# Patient Record
(demographics unavailable — no encounter records)

---

## 2024-10-10 NOTE — HISTORY OF PRESENT ILLNESS
[de-identified] : 10/10/2024: Pt here today for follow-up right knee. Pain and symptoms are about the same. Since last visit pt has had not had relief from CSI at last visit. Today c/o persistent pain in right knee, had good success w/ gel inj series and would like to consider doing another round next month. Last gel, Visco-3 #3, 5/1/2024. Ambulates with cane  9/19/24: He returns for follow up, flare up pain, weakness, instability episodes. He did not find much benefit with PT.  He has been trying to strengthen and do HEP.  He has difficulty with stairs.  He followed with Dr. Kim.  He had second opinion, informed he needs TKA. He is not a position to be OOW to have surgery.  4/18/24: Patient here for follow up of right knee and Visco-3 inj #1  3/21/24: Patient here for follow up of right knee. States pain has not improved. Inquiring about HA injections  2/22/24: The patient is a 59 year old R hand dominant male who presents today complaining of R knee pain.  Date of Injury/Onset: ~01/30/24 Pain At Rest: 3/10  Pain With Activity: 9/10  Mechanism of injury: N/A This not a Work Related Injury being treated under Worker's Compensation. This not an athletic injury occurring associated with an interscholastic or organized sports team. Quality of symptoms: sharp, buckling Improves with: NSAIDs, rest  Worse with: weightbearing, stairs, bending Prior treatment: [facility] care Prior Imaging: x-ray/MRI ([facility]) Reports Available For Review Today: MRI Out of work/sport:  School/Sport/Position/Occupation:   Additional Information: Previous csi (w/ Fortunato) with 12cc aspiration which provided temporary relief

## 2024-10-10 NOTE — DISCUSSION/SUMMARY
[de-identified] : Assessment:   59 year male with history, physical exam and imaging consistent with: RIGHT KNEE ARTHRITIS, RIGHT KNEE MMT  ---------------------------    Plan: - Discussed he ultimately needs TKA.  Increased flare-ups knee pain and mechanical symptoms with any increase in activity  - Submit for Visco 3  - he is aware he cannot have surgery for 3 months post injection  - fu to start Visco3     Follow-up: prn, he is trying to plan to have surgery TKA, in Spring 2025

## 2024-10-10 NOTE — IMAGING
[de-identified] : The patient is a well appearing 59 year old male of their stated age. Patient ambulates with an ANTALGIC gait with cane  Negative straight leg raise bilateral   General: in no acute distress, seated comfortably, moving easily Skin: No discoloration, rashes; on palpation skin is dry, Neuro: Normal sensation all dermatomes, motor all myotomes Vascular: Normal pulses, no edema, normal temperature Coordination and balance: Normal Psych: normal mood and affect, non pressured speech, alert and oriented x3   RIGHT Knee:                         ROM:  3-120 degrees   Lachman: Negative Pivot Shift: Negative Anterior Drawer: Negative Posterior Drawer / Sag: Negative Varus Stress 0 degrees: Stable Varus Stress 30 degrees: Stable Valgus Stress 0 degrees: Stable Valgus Stress 30 degrees: Stable Medial Susan: Positive Lateral Susan: Negative Patella Glide: 2+ Patella Apprehension: Negative Patella Grind: Positive   Palpation: Medial Joint Line: TTP Lateral Joint Line: TTP Medial Collateral Ligament: Nontender Lateral Collateral Ligament/PLC: Nontender Distal Femur: Nontender Proximal Tibia: Nontender Tibial Tubercle: Nontender Distal Pole Patella: Nontender Quadriceps Tendon: Nontender &  Intact Patella Tendon: Nontender &  Intact Medial Distal Hamstring/PES: Nontender Lateral Distal Hamstring: Nontender & Stable Iliotibial Band: Nontender Medial Patellofemoral Ligament: Nontender Adductor: Nontender Proximal GSC-Plantaris: Nontender Calf: Supple & Nontender   Inspection: Deformity: No Erythema: No Ecchymosis: No Abrasions: No Effusion: Moderate Prepatellar Bursitis: No   Neurologic Exam: Sensation L4-S1: Grossly Intact   Motor Exam: Quadriceps: 5 out of 5 Hamstrings: 5 out of 5 EHL: 5 out of 5 FHL: 5 out of 5 TA: 5 out of 5 GS: 5 out of 5   Circulatory/Pulses: Dorsalis Pedis: 2+ Posterior Tibialis: 2+    X-RAYS of the RIGHT KNEE (4 views including AP, Merchant, Tunnel, Lateral): moderate osteoarthritic changes, with spurring, joint space narrowing, subchondral cysts; no fracture or dislocation

## 2025-01-27 NOTE — CARDIOLOGY SUMMARY
[de-identified] : sinus rhythm  [de-identified] : 2019 exercise stress  10 METS [de-identified] : 5/2024 LVEF 61%, trace MR [de-identified] : 5/2024 LE doppler negative for DVT

## 2025-01-27 NOTE — DISCUSSION/SUMMARY
[FreeTextEntry1] : Mr Larsen arrives for routine follow up. Prior visit history as noted.  He is euvolemic on exam.  ECG illustrates sinus rhythm. His blood pressure is quite elevated. His LE edema has since greatly improved.  Unfortunately, Mr larsen has been suffering from swelling due to venous insufficiency.  He will continue furosemide 40mg daily, compression stockings daily as tolerated and elevation when able.  He is now being planned for a venous procedure, which is theoretically quite reasonable.  However, with his blood pressure this high, there may be some issues getting him through the procedure.   We reviewed his echocardiogram that demonstrated normal LV function, LVEF 61%. Stress testing demonstrated normal exercise tolerance without evidence of ischemia.  He will continue his current regimen for B/P including amlodipine 10mg, benazepril 40mg , carvedilol 12.5mg BID and chlorthalidone.  He will check his blood pressure intermittently over the next couple of days, and send us a list of those blood pressures.  He may require some additional medication, which might include a higher dose of carvedilol, or perhaps the addition of hydralazine. [EKG obtained to assist in diagnosis and management of assessed problem(s)] : EKG obtained to assist in diagnosis and management of assessed problem(s)

## 2025-01-27 NOTE — HISTORY OF PRESENT ILLNESS
[FreeTextEntry1] : Erick Abreu presented to the office today for follow-up evaluation.  He was last seen in the office July.  He is now 60 years old, with a history of essential hypertension and reflux. He does not have any known structural heart disease. He presented to the hospital having been referred from his internal medicine office because of essential hypertension which was poorly controlled. Over time his medications have been adjusted.  Echocardiography from August 2020 revealed an ejection fraction of 65%, with age-appropriate valvular calcification, minimal aortic and mitral regurgitation.  Estimated PA systolic pressure was borderline at 35 mmHg.   He has a lot of orthopedic issues, and he has been told he needs a right knee replacement. He was treated with meloxicam for knee pain and inflammation, and developed swelling of the legs.  He was evaluated for here in the office, was felt to have mostly venous insufficiency, for which he was advised to wear compression socks.   He was reevaluated because of edema and his blood pressure in July, 2024.  On furosemide, and with decreased fluid intake, there was improvement in the degree of edema.  On carvedilol, there was improvement in his blood pressure as well.  He presents to the office today having been feeling well from a cardiovascular perspective.  He does not report symptoms of chest discomfort or shortness of breath with activity.  He denies orthopnea, PND.  He continues to experience a tendency toward significant venous congestion, despite the use of compression socks, and relatively frequent use of furosemide.  He denies palpitations, dizziness and syncope.  He reports that his blood pressure has been reasonably controlled, until recently. He went for medical clearance for a vein reflux procedure, and his BP was high, and there was concern that he might need to cancel it.  He had not been checking his blood pressure very frequently, and at this point is concerned that his blood pressure might be too elevated to get the procedure done.

## 2025-01-27 NOTE — CARDIOLOGY SUMMARY
[de-identified] : sinus rhythm  [de-identified] : 2019 exercise stress  10 METS [de-identified] : 5/2024 LVEF 61%, trace MR [de-identified] : 5/2024 LE doppler negative for DVT

## 2025-01-27 NOTE — DISCUSSION/SUMMARY
[FreeTextEntry1] : Mr Larsen arrives for routine follow up. Prior visit history as noted.  He is euvolemic on exam.  ECG illustrates sinus rhythm. His blood pressure is quite elevated. His LE edema has since greatly improved.  Unfortunately, Mr larsen has been suffering from swelling due to venous insufficiency.  He will continue furosemide 40mg daily, compression stockings daily as tolerated and elevation when able.  He is now being planned for a venous procedure, which is theoretically quite reasonable.  However, with his blood pressure this high, there may be some issues getting him through the procedure.   We reviewed his echocardiogram that demonstrated normal LV function, LVEF 61%. Stress testing demonstrated normal exercise tolerance without evidence of ischemia.  He will continue his current regimen for B/P including amlodipine 10mg, benazepril 40mg , carvedilol 12.5mg BID and chlorthalidone.  He will check his blood pressure intermittently over the next couple of days, and send us a list of those blood pressures.  He may require some additional medication, which might include a higher dose of carvedilol, or perhaps the addition of hydralazine. [EKG obtained to assist in diagnosis and management of assessed problem(s)] : EKG obtained to assist in diagnosis and management of assessed problem(s) Additional Progress Note...

## 2025-01-27 NOTE — PHYSICAL EXAM
[General Appearance - Well Developed] : well developed [Normal Appearance] : normal appearance [Well Groomed] : well groomed [General Appearance - Well Nourished] : well nourished [No Deformities] : no deformities [General Appearance - In No Acute Distress] : no acute distress [Eyelids - No Xanthelasma] : the eyelids demonstrated no xanthelasmas [Normal Oral Mucosa] : normal oral mucosa [No Oral Pallor] : no oral pallor [No Oral Cyanosis] : no oral cyanosis [Normal Jugular Venous A Waves Present] : normal jugular venous A waves present [Normal Jugular Venous V Waves Present] : normal jugular venous V waves present [No Jugular Venous Go A Waves] : no jugular venous go A waves [Respiration, Rhythm And Depth] : normal respiratory rhythm and effort [Exaggerated Use Of Accessory Muscles For Inspiration] : no accessory muscle use [Auscultation Breath Sounds / Voice Sounds] : lungs were clear to auscultation bilaterally [Abdomen Soft] : soft [Abdomen Tenderness] : non-tender [Abdomen Mass (___ Cm)] : no abdominal mass palpated [Abnormal Walk] : normal gait [Gait - Sufficient For Exercise Testing] : the gait was sufficient for exercise testing [Nail Clubbing] : no clubbing of the fingernails [Cyanosis, Localized] : no localized cyanosis [Petechial Hemorrhages (___cm)] : no petechial hemorrhages [Skin Color & Pigmentation] : normal skin color and pigmentation [] : no rash [No Venous Stasis] : no venous stasis [Skin Lesions] : no skin lesions [No Skin Ulcers] : no skin ulcer [No Xanthoma] : no  xanthoma was observed [Oriented To Time, Place, And Person] : oriented to person, place, and time [Affect] : the affect was normal [Mood] : the mood was normal [No Anxiety] : not feeling anxious [Normal Rate] : normal [2+] : left 2+ [Well Developed] : well developed [Normal Conjunctiva] : normal conjunctiva [Normal Venous Pressure] : normal venous pressure [Rhythm Regular] : regular [Normal S1] : normal S1 [Normal S2] : normal S2 [No Murmur] : no murmurs heard [___ +] : bilateral [unfilled]U+ pretibial pitting edema [Rt] : varicose veins of the right leg noted [Lt] : varicose veins of the left leg noted [No Abnormalities] : the abdominal aorta was not enlarged and no bruit was heard [Clear Lung Fields] : clear lung fields [Soft] : abdomen soft [Non Tender] : non-tender [Normal Gait] : normal gait [No Rash] : no rash [Moves all extremities] : moves all extremities [No Focal Deficits] : no focal deficits [Alert and Oriented] : alert and oriented [S3] : no S3 [S4] : no S4 [Right Femoral Bruit] : no bruit heard over the right femoral artery [Left Femoral Bruit] : no bruit heard over the left femoral artery [Right Carotid Bruit] : no bruit heard over the right carotid [Left Carotid Bruit] : no bruit heard over the left carotid

## 2025-06-25 NOTE — ASSESSMENT
[FreeTextEntry1] : 60M p/w R knee OA, SONK with collapse  He would like to proceed with R TKA, r b a explained, preop CT to eval bone loss  We discussed my findings and the natural history of their condition. We talked about the details of the proposed surgery and the recovery. We discussed the material risks, possible benefits and alternatives to surgery. The risks include but are not limited to infection, bleeding and possible need for blood transfusion, fracture, bowel blockage, bladder retention or infection, need for reoperation, stiffness and/or limited range of motion, possible damage to nerves and blood vessels, failure of fixation of components, risk of deep vein thromboses and pulmonary embolism, wound healing problems, dislocation, and possible leg length discrepancy. Although incredibly rare, we also discussed the risks of a cardiac event, stroke and even death during, or following, the surgery. We discussed the type of implants the patient will be receiving and the type of fixation that will be used, as well as whether a robot or computer navigation aide will be used. The patient understands they will need medical clearance and will attend a preoperative joint education class. We also discussed the type of anesthesia they will receive, and the risks associated with hospital or rehab length of stay, obesity, diabetes and smoking.

## 2025-06-25 NOTE — PHYSICAL EXAM
[NL (0)] : extension 0 degrees [5___] : hamstring 5[unfilled]/5 [] : antalgic [Right] : right knee [advanced tricompartmental OA with medial compartment narrowing and varus alignment] : advanced tricompartmental OA with medial compartment narrowing and varus alignment [AVN] : AVN [FreeTextEntry9] : SONK with collapse [TWNoteComboBox7] : flexion 120 degrees

## 2025-06-25 NOTE — HISTORY OF PRESENT ILLNESS
[de-identified] : Patient is a 60 year old male complaining of right knee pain. Patient states pain has been going on for years. Denies any new injury. has prior hx of meniscus tear. States having popping and clicking of the knee. Pain is a sharp stabbing pain localized on the medial anterior side of the knee. Denies any numbness or tingling.